# Patient Record
Sex: FEMALE | Race: WHITE | NOT HISPANIC OR LATINO
[De-identification: names, ages, dates, MRNs, and addresses within clinical notes are randomized per-mention and may not be internally consistent; named-entity substitution may affect disease eponyms.]

---

## 2021-11-02 ENCOUNTER — APPOINTMENT (OUTPATIENT)
Dept: HEART AND VASCULAR | Facility: CLINIC | Age: 46
End: 2021-11-02
Payer: MEDICARE

## 2021-11-02 ENCOUNTER — LABORATORY RESULT (OUTPATIENT)
Age: 46
End: 2021-11-02

## 2021-11-02 ENCOUNTER — NON-APPOINTMENT (OUTPATIENT)
Age: 46
End: 2021-11-02

## 2021-11-02 VITALS
HEIGHT: 61 IN | BODY MASS INDEX: 29.64 KG/M2 | TEMPERATURE: 97.8 F | SYSTOLIC BLOOD PRESSURE: 130 MMHG | DIASTOLIC BLOOD PRESSURE: 70 MMHG | OXYGEN SATURATION: 98 % | WEIGHT: 157 LBS | HEART RATE: 96 BPM

## 2021-11-02 DIAGNOSIS — E03.9 HYPOTHYROIDISM, UNSPECIFIED: ICD-10-CM

## 2021-11-02 DIAGNOSIS — R07.89 OTHER CHEST PAIN: ICD-10-CM

## 2021-11-02 DIAGNOSIS — R00.2 PALPITATIONS: ICD-10-CM

## 2021-11-02 PROCEDURE — 93000 ELECTROCARDIOGRAM COMPLETE: CPT

## 2021-11-02 PROCEDURE — 99205 OFFICE O/P NEW HI 60 MIN: CPT

## 2021-11-03 LAB
CRP SERPL-MCNC: 7 MG/L
ERYTHROCYTE [SEDIMENTATION RATE] IN BLOOD BY WESTERGREN METHOD: 22 MM/HR
TSH SERPL-ACNC: 0.15 UIU/ML

## 2021-11-04 ENCOUNTER — NON-APPOINTMENT (OUTPATIENT)
Age: 46
End: 2021-11-04

## 2021-11-24 NOTE — DISCUSSION/SUMMARY
[___ Month(s)] : in [unfilled] month(s) [FreeTextEntry1] : 47 y/o F no significant PMHx here for evaluation of chest pain, palpitations / heart flutters that she feels started since she got the COVID vaccine ~ 3 weeks ago\par \par # Chest Pain\par Does not sound ischemic, not associated with exertion\par ? Pericarditis, although EKG does not evidence of it\par Will order Echo and inflammatory markers\par Consider EKG stress test if work up does not reveal anything\par \par # Palpitations\par # Heart flutters\par Event monitor for 2 weeks to assess for arrhytmia\par Echo ordered\par EKG ordered today - shows Sinus tach\par Labs ordered; TSH, ESR, CRP\par \par Update 11/24: Result of event monitor shows persistent tachycardia and episode of SVT, patient is symptomatic and will start her on low dose BB for now and follow up in 2-3 weeks

## 2021-11-24 NOTE — HISTORY OF PRESENT ILLNESS
[FreeTextEntry1] : 45 y/o F PMHx of Hypothyroidism here for evaluation of chest pain, palpitations / heart flutters that she feels started since she got the COVID vaccine ~ 3 weeks ago\par \par Prior to receiving vaccine patient states that she was feeling well without these symptoms\par \par Denies leg swelling, PND, Orthopnes\par \par EKG 11/2/2021: Sinus tachycardia,

## 2021-11-24 NOTE — REVIEW OF SYSTEMS
[SOB] : shortness of breath [Chest Discomfort] : chest discomfort [Palpitations] : palpitations [Negative] : Heme/Lymph [Dyspnea on exertion] : not dyspnea during exertion [Lower Ext Edema] : no extremity edema [Leg Claudication] : no intermittent leg claudication [Orthopnea] : no orthopnea [PND] : no PND [Syncope] : no syncope

## 2022-01-11 ENCOUNTER — APPOINTMENT (OUTPATIENT)
Dept: HEART AND VASCULAR | Facility: CLINIC | Age: 47
End: 2022-01-11
Payer: MEDICARE

## 2022-01-11 ENCOUNTER — NON-APPOINTMENT (OUTPATIENT)
Age: 47
End: 2022-01-11

## 2022-01-11 VITALS
DIASTOLIC BLOOD PRESSURE: 80 MMHG | HEART RATE: 93 BPM | WEIGHT: 163 LBS | SYSTOLIC BLOOD PRESSURE: 105 MMHG | OXYGEN SATURATION: 98 % | HEIGHT: 61 IN | TEMPERATURE: 97.5 F | BODY MASS INDEX: 30.78 KG/M2

## 2022-01-11 DIAGNOSIS — R07.9 CHEST PAIN, UNSPECIFIED: ICD-10-CM

## 2022-01-11 DIAGNOSIS — Z00.00 ENCOUNTER FOR GENERAL ADULT MEDICAL EXAMINATION W/OUT ABNORMAL FINDINGS: ICD-10-CM

## 2022-01-11 PROCEDURE — 93000 ELECTROCARDIOGRAM COMPLETE: CPT

## 2022-01-11 PROCEDURE — 99214 OFFICE O/P EST MOD 30 MIN: CPT

## 2022-01-11 RX ORDER — METOPROLOL SUCCINATE 50 MG/1
50 TABLET, EXTENDED RELEASE ORAL DAILY
Qty: 90 | Refills: 3 | Status: ACTIVE | COMMUNITY
Start: 2021-11-24 | End: 1900-01-01

## 2022-01-11 RX ORDER — ASPIRIN 325 MG/1
325 TABLET, FILM COATED ORAL TWICE DAILY
Qty: 20 | Refills: 0 | Status: DISCONTINUED | COMMUNITY
Start: 2021-11-04 | End: 2022-01-11

## 2022-01-12 NOTE — DISCUSSION/SUMMARY
[___ Month(s)] : in [unfilled] month(s) [FreeTextEntry1] : 47 y/o F no significant PMHx here for evaluation of chest pain, palpitations / heart flutters that she feels started since she got the COVID vaccine\par \par # Chest Pain\par Workup with Echo and inflammatory markers non-revealing\par Still having symptoms occasionally\par EKG stress test ordered today\par \par # Palpitations\par #SVT\par # Inappropriate sinus tach\par # Heart flutters\par # Pre-HTN\par Started on BB for symptom control\par Lifestyle changes and counseling\par Event monitor performed\par Echo performed\par EKG ordered today - NSR

## 2022-01-12 NOTE — HISTORY OF PRESENT ILLNESS
[FreeTextEntry1] : 47 y/o F PMHx of Hypothyroidism here for evaluation of chest pain, palpitations / heart flutters that she feels started since she got the COVID vaccine\par \par Prior to receiving vaccine patient states that she was feeling well without these symptoms\par \par Denies leg swelling, PND, Orthopnes\par \par EKG 11/2/2021: Sinus tachycardia,  \par Echo 11/3/2021: EF 63%, trace MR, trace TR, wnl\par Holter 11/24/2021: Normal, one short ~ 13 beat epi of SVT, mostly sinus tachycardia\par EKG 1/11/2022: NSR, HR 88\par \par Patient reports some improvement since starting BB, however still has episodes of tachycardia\par Still has occasional chest tightness

## 2023-02-01 ENCOUNTER — NON-APPOINTMENT (OUTPATIENT)
Age: 48
End: 2023-02-01

## 2023-02-21 ENCOUNTER — APPOINTMENT (OUTPATIENT)
Dept: HEART AND VASCULAR | Facility: CLINIC | Age: 48
End: 2023-02-21

## 2024-04-09 ENCOUNTER — NON-APPOINTMENT (OUTPATIENT)
Age: 49
End: 2024-04-09

## 2024-04-22 ENCOUNTER — NON-APPOINTMENT (OUTPATIENT)
Age: 49
End: 2024-04-22